# Patient Record
Sex: FEMALE | Race: WHITE | NOT HISPANIC OR LATINO | Employment: FULL TIME | URBAN - NONMETROPOLITAN AREA
[De-identification: names, ages, dates, MRNs, and addresses within clinical notes are randomized per-mention and may not be internally consistent; named-entity substitution may affect disease eponyms.]

---

## 2018-01-02 ENCOUNTER — APPOINTMENT (OUTPATIENT)
Dept: CT IMAGING | Facility: HOSPITAL | Age: 43
End: 2018-01-02

## 2018-01-02 ENCOUNTER — APPOINTMENT (OUTPATIENT)
Dept: GENERAL RADIOLOGY | Facility: HOSPITAL | Age: 43
End: 2018-01-02

## 2018-01-02 ENCOUNTER — HOSPITAL ENCOUNTER (EMERGENCY)
Facility: HOSPITAL | Age: 43
Discharge: HOME | End: 2018-01-02
Attending: EMERGENCY MEDICINE | Admitting: EMERGENCY MEDICINE

## 2018-01-02 VITALS
DIASTOLIC BLOOD PRESSURE: 88 MMHG | SYSTOLIC BLOOD PRESSURE: 136 MMHG | OXYGEN SATURATION: 100 % | TEMPERATURE: 98.3 F | WEIGHT: 293 LBS | RESPIRATION RATE: 18 BRPM | HEIGHT: 64 IN | BODY MASS INDEX: 50.02 KG/M2 | HEART RATE: 77 BPM

## 2018-01-02 DIAGNOSIS — S16.1XXA NECK STRAIN, INITIAL ENCOUNTER: ICD-10-CM

## 2018-01-02 DIAGNOSIS — S09.90XA INJURY OF HEAD, INITIAL ENCOUNTER: ICD-10-CM
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
## 2018-01-02 PROCEDURE — 72040 X-RAY EXAM NECK SPINE 2-3 VW: CPT

## 2018-01-02 PROCEDURE — 99283 EMERGENCY DEPT VISIT LOW MDM: CPT

## 2018-01-02 PROCEDURE — 70450 CT HEAD/BRAIN W/O DYE: CPT

## 2018-01-02 RX ORDER — CYCLOBENZAPRINE HCL 10 MG
10 TABLET ORAL 2 TIMES DAILY PRN
Qty: 14 TABLET | Refills: 0 | Status: SHIPPED | OUTPATIENT
Start: 2018-01-02 | End: 2018-06-23

## 2018-01-02 RX ORDER — CYCLOBENZAPRINE HCL 10 MG
10 TABLET ORAL ONCE
Status: COMPLETED | OUTPATIENT
Start: 2018-01-02 | End: 2018-01-02

## 2018-01-02 RX ORDER — ONDANSETRON 4 MG/1
4 TABLET, ORALLY DISINTEGRATING ORAL ONCE
Status: COMPLETED | OUTPATIENT
Start: 2018-01-02 | End: 2018-01-02

## 2018-01-02 RX ORDER — ONDANSETRON 4 MG/1
4 TABLET, ORALLY DISINTEGRATING ORAL EVERY 6 HOURS PRN
Qty: 15 TABLET | Refills: 0 | Status: SHIPPED | OUTPATIENT
Start: 2018-01-02 | End: 2018-06-23

## 2018-01-02 RX ADMIN — CYCLOBENZAPRINE HYDROCHLORIDE 10 MG: 10 TABLET, FILM COATED ORAL at 22:25

## 2018-01-02 RX ADMIN — ONDANSETRON 4 MG: 4 TABLET, ORALLY DISINTEGRATING ORAL at 21:02
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
## 2018-03-17 ENCOUNTER — APPOINTMENT (OUTPATIENT)
Dept: CT IMAGING | Facility: HOSPITAL | Age: 43
End: 2018-03-17

## 2018-03-17 ENCOUNTER — APPOINTMENT (OUTPATIENT)
Dept: GENERAL RADIOLOGY | Facility: HOSPITAL | Age: 43
End: 2018-03-17

## 2018-03-17 ENCOUNTER — HOSPITAL ENCOUNTER (EMERGENCY)
Facility: HOSPITAL | Age: 43
Discharge: HOME | End: 2018-03-17
Attending: EMERGENCY MEDICINE | Admitting: EMERGENCY MEDICINE

## 2018-03-17 VITALS
SYSTOLIC BLOOD PRESSURE: 135 MMHG | RESPIRATION RATE: 16 BRPM | WEIGHT: 290 LBS | BODY MASS INDEX: 49.51 KG/M2 | HEART RATE: 80 BPM | OXYGEN SATURATION: 98 % | TEMPERATURE: 99.1 F | HEIGHT: 64 IN | DIASTOLIC BLOOD PRESSURE: 86 MMHG

## 2018-03-17 DIAGNOSIS — J18.9 PNEUMONIA DUE TO INFECTIOUS ORGANISM, UNSPECIFIED LATERALITY, UNSPECIFIED PART OF LUNG: Primary | ICD-10-CM

## 2018-03-17 LAB
ALBUMIN SERPL-MCNC: 4 G/DL (ref 3.5–5)
ALBUMIN/GLOB SERPL: 1.1 G/DL (ref 1–2)
ALP SERPL-CCNC: 76 U/L (ref 38–126)
ALT SERPL W P-5'-P-CCNC: 28 U/L (ref 13–69)
ANION GAP SERPL CALCULATED.3IONS-SCNC: 16.7 MMOL/L
AST SERPL-CCNC: 24 U/L (ref 15–46)
BASOPHILS # BLD AUTO: 0.08 10*3/MM3 (ref 0–0.2)
BASOPHILS NFR BLD AUTO: 0.8 % (ref 0–2.5)
BILIRUB SERPL-MCNC: 0.3 MG/DL (ref 0.2–1.3)
BUN BLD-MCNC: 13 MG/DL (ref 7–20)
BUN/CREAT SERPL: 16.3 (ref 7.1–23.5)
CALCIUM SPEC-SCNC: 9.3 MG/DL (ref 8.4–10.2)
CHLORIDE SERPL-SCNC: 105 MMOL/L (ref 98–107)
CO2 SERPL-SCNC: 27 MMOL/L (ref 26–30)
CREAT BLD-MCNC: 0.8 MG/DL (ref 0.6–1.3)
DEPRECATED RDW RBC AUTO: 41.6 FL (ref 37–54)
EOSINOPHIL # BLD AUTO: 0.21 10*3/MM3 (ref 0–0.7)
EOSINOPHIL NFR BLD AUTO: 2 % (ref 0–7)
ERYTHROCYTE [DISTWIDTH] IN BLOOD BY AUTOMATED COUNT: 12.6 % (ref 11.5–14.5)
GFR SERPL CREATININE-BSD FRML MDRD: 79 ML/MIN/1.73
GLOBULIN UR ELPH-MCNC: 3.5 GM/DL
GLUCOSE BLD-MCNC: 100 MG/DL (ref 74–98)
HCG SERPL QL: NEGATIVE
HCT VFR BLD AUTO: 41.4 % (ref 37–47)
HGB BLD-MCNC: 13.6 G/DL (ref 12–16)
HOLD SPECIMEN: NORMAL
HOLD SPECIMEN: NORMAL
IMM GRANULOCYTES # BLD: 0.03 10*3/MM3 (ref 0–0.06)
IMM GRANULOCYTES NFR BLD: 0.3 % (ref 0–0.6)
LYMPHOCYTES # BLD AUTO: 2.39 10*3/MM3 (ref 0.6–3.4)
LYMPHOCYTES NFR BLD AUTO: 22.7 % (ref 10–50)
MCH RBC QN AUTO: 29.8 PG (ref 27–31)
MCHC RBC AUTO-ENTMCNC: 32.9 G/DL (ref 30–37)
MCV RBC AUTO: 90.8 FL (ref 81–99)
MONOCYTES # BLD AUTO: 0.98 10*3/MM3 (ref 0–0.9)
MONOCYTES NFR BLD AUTO: 9.3 % (ref 0–12)
NEUTROPHILS # BLD AUTO: 6.86 10*3/MM3 (ref 2–6.9)
NEUTROPHILS NFR BLD AUTO: 64.9 % (ref 37–80)
NRBC BLD MANUAL-RTO: 0 /100 WBC (ref 0–0)
PLATELET # BLD AUTO: 341 10*3/MM3 (ref 130–400)
PMV BLD AUTO: 8.8 FL (ref 6–12)
POTASSIUM BLD-SCNC: 3.7 MMOL/L (ref 3.5–5.1)
PROT SERPL-MCNC: 7.5 G/DL (ref 6.3–8.2)
RBC # BLD AUTO: 4.56 10*6/MM3 (ref 4.2–5.4)
SODIUM BLD-SCNC: 145 MMOL/L (ref 137–145)
TROPONIN I SERPL-MCNC: 0 NG/ML (ref 0–0.05)
TROPONIN I SERPL-MCNC: <0.012 NG/ML (ref 0–0.03)
WBC NRBC COR # BLD: 10.55 10*3/MM3 (ref 4.8–10.8)
WHOLE BLOOD HOLD SPECIMEN: NORMAL
WHOLE BLOOD HOLD SPECIMEN: NORMAL

## 2018-03-17 PROCEDURE — 93005 ELECTROCARDIOGRAM TRACING: CPT

## 2018-03-17 PROCEDURE — 80053 COMPREHEN METABOLIC PANEL: CPT | Performed by: EMERGENCY MEDICINE

## 2018-03-17 PROCEDURE — 71275 CT ANGIOGRAPHY CHEST: CPT

## 2018-03-17 PROCEDURE — 94640 AIRWAY INHALATION TREATMENT: CPT

## 2018-03-17 PROCEDURE — 99284 EMERGENCY DEPT VISIT MOD MDM: CPT

## 2018-03-17 PROCEDURE — 25010000002 IOPAMIDOL 61 % SOLUTION: Performed by: EMERGENCY MEDICINE

## 2018-03-17 PROCEDURE — 85025 COMPLETE CBC W/AUTO DIFF WBC: CPT | Performed by: EMERGENCY MEDICINE

## 2018-03-17 PROCEDURE — 93005 ELECTROCARDIOGRAM TRACING: CPT | Performed by: EMERGENCY MEDICINE

## 2018-03-17 PROCEDURE — 84484 ASSAY OF TROPONIN QUANT: CPT | Performed by: EMERGENCY MEDICINE

## 2018-03-17 PROCEDURE — 84703 CHORIONIC GONADOTROPIN ASSAY: CPT | Performed by: EMERGENCY MEDICINE

## 2018-03-17 PROCEDURE — 94799 UNLISTED PULMONARY SVC/PX: CPT

## 2018-03-17 PROCEDURE — 71045 X-RAY EXAM CHEST 1 VIEW: CPT

## 2018-03-17 RX ORDER — CEFUROXIME AXETIL 500 MG/1
500 TABLET ORAL 2 TIMES DAILY
Qty: 20 TABLET | Refills: 0 | Status: SHIPPED | OUTPATIENT
Start: 2018-03-17 | End: 2018-06-23

## 2018-03-17 RX ORDER — AZITHROMYCIN 250 MG/1
TABLET, FILM COATED ORAL
Qty: 4 TABLET | Refills: 0 | Status: SHIPPED | OUTPATIENT
Start: 2018-03-17 | End: 2018-06-23

## 2018-03-17 RX ORDER — PREDNISONE 20 MG/1
20 TABLET ORAL DAILY
Qty: 4 TABLET | Refills: 0 | Status: SHIPPED | OUTPATIENT
Start: 2018-03-17 | End: 2018-06-23

## 2018-03-17 RX ORDER — AZITHROMYCIN 250 MG/1
500 TABLET, FILM COATED ORAL ONCE
Status: COMPLETED | OUTPATIENT
Start: 2018-03-17 | End: 2018-03-17

## 2018-03-17 RX ORDER — IPRATROPIUM BROMIDE AND ALBUTEROL SULFATE 2.5; .5 MG/3ML; MG/3ML
3 SOLUTION RESPIRATORY (INHALATION) ONCE
Status: COMPLETED | OUTPATIENT
Start: 2018-03-17 | End: 2018-03-17

## 2018-03-17 RX ORDER — SODIUM CHLORIDE 0.9 % (FLUSH) 0.9 %
10 SYRINGE (ML) INJECTION AS NEEDED
Status: DISCONTINUED | OUTPATIENT
Start: 2018-03-17 | End: 2018-03-17 | Stop reason: HOSPADM

## 2018-03-17 RX ORDER — CEFUROXIME AXETIL 250 MG/1
500 TABLET ORAL ONCE
Status: COMPLETED | OUTPATIENT
Start: 2018-03-17 | End: 2018-03-17

## 2018-03-17 RX ADMIN — CEFUROXIME AXETIL 500 MG: 250 TABLET ORAL at 07:15

## 2018-03-17 RX ADMIN — IPRATROPIUM BROMIDE AND ALBUTEROL SULFATE 3 ML: .5; 3 SOLUTION RESPIRATORY (INHALATION) at 05:00

## 2018-03-17 RX ADMIN — AZITHROMYCIN 500 MG: 250 TABLET, FILM COATED ORAL at 07:15

## 2018-03-17 RX ADMIN — IOPAMIDOL 100 ML: 612 INJECTION, SOLUTION INTRAVENOUS at 05:33

## 2018-03-17 NOTE — ED PROVIDER NOTES
TRIAGE CHIEF COMPLAINT:     Nursing and triage notes reviewed    Chief Complaint   Patient presents with   • Shortness of Breath      HPI: Kenya Hannon is a 42 y.o. female who presents to the emergency department complaining of Shortness of breath.  Patient states that over the past several days she has had symptoms of an upper respiratory infection with nasal drainage and congestion.  She states she's felt progressively more short of breath during this time as well and becomes very dyspneic on exertion.  She states occasionally she has had some left-sided chest pain that she describes as pressure like lasting for a few minutes at a time and nonradiating.  This does not seem to be associated with exertion.'s obvious swelling in her lower extremities.  She states a friend told her she might have a blood clot and she should come to the emergency department.  She's not had any fevers or chills that she is aware of.  She denies any current discomfort.     REVIEW OF SYSTEMS: All other systems reviewed and are negative     PAST MEDICAL HISTORY:   Past Medical History:   Diagnosis Date   • Anemia    • PFO (patent foramen ovale)         FAMILY HISTORY:   History reviewed. No pertinent family history.     SOCIAL HISTORY:   Social History     Social History   • Marital status:      Spouse name: N/A   • Number of children: N/A   • Years of education: N/A     Occupational History   • Not on file.     Social History Main Topics   • Smoking status: Never Smoker   • Smokeless tobacco: Never Used   • Alcohol use Yes      Comment: socially    • Drug use: No   • Sexual activity: Defer     Other Topics Concern   • Not on file     Social History Narrative   • No narrative on file        SURGICAL HISTORY:   Past Surgical History:   Procedure Laterality Date   • CHOLECYSTECTOMY     • DILATATION AND CURETTAGE          CURRENT MEDICATIONS:      Medication List      ASK your doctor about these medications    CELEXA PO      cyclobenzaprine 10 MG tablet  Commonly known as:  FLEXERIL  Take 1 tablet by mouth 2 (Two) Times a Day As Needed for Muscle Spasms.     IRON-C PO     ondansetron ODT 4 MG disintegrating tablet  Commonly known as:  ZOFRAN-ODT  Take 1 tablet by mouth Every 6 (Six) Hours As Needed for Nausea or   Vomiting.           ALLERGIES: Review of patient's allergies indicates no known allergies.     PHYSICAL EXAM:   VITAL SIGNS:   Vitals:    03/17/18 0500   BP:    Pulse: 73   Resp: 16   Temp:    SpO2: 97%      CONSTITUTIONAL: Awake, oriented, appears non-toxic   HENT: Atraumatic, normocephalic, oral mucosa pink and moist, airway patent. Nares patent with a small amount of clear drainage drainage. External ears normal.   EYES: Conjunctiva clear, EOMI, PERRL   NECK: Trachea midline, non-tender, supple   CARDIOVASCULAR: Normal heart rate, Normal rhythm, No murmurs, rubs, gallops   PULMONARY/CHEST: Clear to auscultation, no rhonchi, wheezes, or rales. Symmetrical breath sounds.  ABDOMINAL: Non-distended, soft, non-tender - no rebound or guarding.   NEUROLOGIC: Non-focal, moving all four extremities, no gross sensory or motor deficits.   EXTREMITIES: No clubbing, cyanosis, or edema   SKIN: Warm, Dry, No erythema, No rash     ED COURSE / MEDICAL DECISION MAKING:   Kenya Hannon is a 42 y.o. female who presents to the emergency department for evaluation of shortness of breath.  Patient nondistressed on arrival was stable vital signs.  Patient breathing comfortably on room air on arrival.  No obvious abnormal lung sounds heard on examination.  EKG obtained on arrival reveals sinus rhythm with a rate of 78 bpm.  There are some nonspecific findings but no obvious acute ischemic changes.  Patient does take birth control home and so does carry a higher risk of blood clot.  We'll obtain imaging as well as labs for further evaluation.  Labs were largely unremarkable.  CT scan revealed bilateral groundglass opacities which likely represent  an infectious process.  Patient started on antibiotics.  She is safe for outpatient therapy.  Return precautions were discussed.    DECISION TO DISCHARGE/ADMIT: see ED care timeline     FINAL IMPRESSION:   1 -- pneumonia   2 --   3 --     Electronically signed by: Clementina Delarosa MD, 3/17/2018 5:21 AM       Clementina Delarosa MD  03/17/18 0708

## 2018-12-17 ENCOUNTER — HOSPITAL ENCOUNTER (EMERGENCY)
Facility: HOSPITAL | Age: 43
Discharge: HOME | End: 2018-12-17
Attending: STUDENT IN AN ORGANIZED HEALTH CARE EDUCATION/TRAINING PROGRAM | Admitting: STUDENT IN AN ORGANIZED HEALTH CARE EDUCATION/TRAINING PROGRAM

## 2018-12-17 VITALS
HEIGHT: 64 IN | BODY MASS INDEX: 50.02 KG/M2 | TEMPERATURE: 98.8 F | WEIGHT: 293 LBS | SYSTOLIC BLOOD PRESSURE: 126 MMHG | DIASTOLIC BLOOD PRESSURE: 78 MMHG | HEART RATE: 71 BPM | RESPIRATION RATE: 18 BRPM | OXYGEN SATURATION: 98 %

## 2018-12-17 DIAGNOSIS — T78.40XA ALLERGIC REACTION, INITIAL ENCOUNTER: Primary | ICD-10-CM

## 2018-12-17 PROCEDURE — 96374 THER/PROPH/DIAG INJ IV PUSH: CPT

## 2018-12-17 PROCEDURE — 25010000002 DEXAMETHASONE SODIUM PHOSPHATE 10 MG/ML SOLUTION: Performed by: STUDENT IN AN ORGANIZED HEALTH CARE EDUCATION/TRAINING PROGRAM

## 2018-12-17 PROCEDURE — 25010000002 DIPHENHYDRAMINE PER 50 MG: Performed by: STUDENT IN AN ORGANIZED HEALTH CARE EDUCATION/TRAINING PROGRAM

## 2018-12-17 PROCEDURE — 99283 EMERGENCY DEPT VISIT LOW MDM: CPT

## 2018-12-17 PROCEDURE — 96375 TX/PRO/DX INJ NEW DRUG ADDON: CPT

## 2018-12-17 RX ORDER — DIPHENHYDRAMINE HYDROCHLORIDE 50 MG/ML
25 INJECTION INTRAMUSCULAR; INTRAVENOUS ONCE
Status: COMPLETED | OUTPATIENT
Start: 2018-12-17 | End: 2018-12-17

## 2018-12-17 RX ORDER — DEXAMETHASONE SODIUM PHOSPHATE 10 MG/ML
10 INJECTION, SOLUTION INTRAMUSCULAR; INTRAVENOUS ONCE
Status: COMPLETED | OUTPATIENT
Start: 2018-12-17 | End: 2018-12-17

## 2018-12-17 RX ORDER — FAMOTIDINE 10 MG/ML
20 INJECTION, SOLUTION INTRAVENOUS ONCE
Status: COMPLETED | OUTPATIENT
Start: 2018-12-17 | End: 2018-12-17

## 2018-12-17 RX ADMIN — DIPHENHYDRAMINE HYDROCHLORIDE 25 MG: 50 INJECTION INTRAMUSCULAR; INTRAVENOUS at 21:15

## 2018-12-17 RX ADMIN — FAMOTIDINE 20 MG: 10 INJECTION, SOLUTION INTRAVENOUS at 21:19

## 2018-12-17 RX ADMIN — DEXAMETHASONE SODIUM PHOSPHATE 10 MG: 10 INJECTION, SOLUTION INTRAMUSCULAR; INTRAVENOUS at 21:17

## 2018-12-18 NOTE — ED PROVIDER NOTES
Subjective   43-year-old female that presents with allergic reaction.  Patient states over the past 2 days she's had progressively worsening hives and itching and tonight her throat began to itch and feel like it was swelling.  Patient has had these reactions quite frequently recently and has been following with an immunologist to try to determine the cause.  Patient states her symptoms have been progressively worsening.  States her rash is very itchy.  There is no swelling of her lips or tongue.            Review of Systems   All other systems reviewed and are negative.      Past Medical History:   Diagnosis Date   • Anemia    • Anxiety    • Depression    • GERD (gastroesophageal reflux disease)    • PFO (patent foramen ovale)        No Known Allergies    Past Surgical History:   Procedure Laterality Date   • CHOLECYSTECTOMY     • DILATATION AND CURETTAGE         Family History   Problem Relation Age of Onset   • Diabetes Mother    • Heart disease Mother    • Heart disease Father    • Diabetes Father        Social History     Socioeconomic History   • Marital status:      Spouse name: Not on file   • Number of children: Not on file   • Years of education: Not on file   • Highest education level: Not on file   Tobacco Use   • Smoking status: Never Smoker   • Smokeless tobacco: Never Used   Substance and Sexual Activity   • Alcohol use: Yes     Comment: socially    • Drug use: No   • Sexual activity: Defer           Objective   Physical Exam   Nursing note and vitals reviewed.   GEN: No acute distress  Skin: Patient does have urticarial hives covering her chest and neck  Head: Normocephalic, atraumatic  Eyes: Pupils equal round reactive to light  ENT: Posterior pharynx normal in appearance, no swelling of the lips tongue or of the soft tissues of the oropharynx oral mucosa is moist  Chest: Nontender to palpation  Cardiovascular: Regular rate  Lungs: Clear to auscultation bilaterally  Abdomen: Soft, nontender,  nondistended, no peritoneal signs  Extremities: No edema, normal appearance  Neuro: GCS 15  Psych: Mood and affect are appropriate        Procedures           ED Course                  MDM  Number of Diagnoses or Management Options  Allergic reaction, initial encounter:   Diagnosis management comments: Patient is treated with IV steroids, H1 and H2 blockers.  She was observed in the emergency department for several hours with improvement in her symptoms.       Amount and/or Complexity of Data Reviewed  Decide to obtain previous medical records or to obtain history from someone other than the patient: yes  Review and summarize past medical records: yes          Final diagnoses:   Allergic reaction, initial encounter            Jose Canela MD  12/17/18 0857

## 2018-12-18 NOTE — DISCHARGE INSTRUCTIONS
Continue to follow-up with your allergist would determine what is causing her symptoms.  If you have new or worsening symptoms please return to the emergency department.

## 2019-11-08 ENCOUNTER — TELEPHONE (OUTPATIENT)
Dept: URGENT CARE | Facility: CLINIC | Age: 44
End: 2019-11-08

## 2019-11-08 DIAGNOSIS — N39.0 URINARY TRACT INFECTION WITHOUT HEMATURIA, SITE UNSPECIFIED: Primary | ICD-10-CM

## 2019-11-08 RX ORDER — SULFAMETHOXAZOLE AND TRIMETHOPRIM 800; 160 MG/1; MG/1
TABLET ORAL
Qty: 14 TABLET | Refills: 0 | Status: SHIPPED | OUTPATIENT
Start: 2019-11-08 | End: 2020-11-17

## 2019-11-08 NOTE — TELEPHONE ENCOUNTER
I have spoken with patient.  She needs to either return to the clinic for recheck or follow-up with her primary care.  Patient is aware

## 2020-11-17 PROCEDURE — U0004 COV-19 TEST NON-CDC HGH THRU: HCPCS | Performed by: NURSE PRACTITIONER

## 2022-04-12 ENCOUNTER — OFFICE VISIT (OUTPATIENT)
Dept: OBSTETRICS AND GYNECOLOGY | Facility: CLINIC | Age: 47
End: 2022-04-12

## 2022-04-12 VITALS
WEIGHT: 293 LBS | BODY MASS INDEX: 50.02 KG/M2 | DIASTOLIC BLOOD PRESSURE: 70 MMHG | SYSTOLIC BLOOD PRESSURE: 118 MMHG | HEIGHT: 64 IN

## 2022-04-12 DIAGNOSIS — N93.9 ABNORMAL UTERINE BLEEDING (AUB): ICD-10-CM

## 2022-04-12 DIAGNOSIS — N39.3 STRESS INCONTINENCE IN FEMALE: ICD-10-CM

## 2022-04-12 DIAGNOSIS — Z01.419 ROUTINE GYNECOLOGICAL EXAMINATION: Primary | ICD-10-CM

## 2022-04-12 DIAGNOSIS — E66.01 MORBID OBESITY WITH BMI OF 50.0-59.9, ADULT: ICD-10-CM

## 2022-04-12 PROCEDURE — 99213 OFFICE O/P EST LOW 20 MIN: CPT | Performed by: OBSTETRICS & GYNECOLOGY

## 2022-04-12 PROCEDURE — 99396 PREV VISIT EST AGE 40-64: CPT | Performed by: OBSTETRICS & GYNECOLOGY

## 2022-04-12 RX ORDER — SCOLOPAMINE TRANSDERMAL SYSTEM 1 MG/1
PATCH, EXTENDED RELEASE TRANSDERMAL
COMMUNITY
Start: 2022-01-03 | End: 2022-12-12

## 2022-04-12 RX ORDER — ESCITALOPRAM OXALATE 10 MG/1
10 TABLET ORAL DAILY
COMMUNITY
Start: 2022-03-23

## 2022-04-12 RX ORDER — CLONAZEPAM 1 MG/1
TABLET ORAL
COMMUNITY
Start: 2022-01-03

## 2022-04-13 DIAGNOSIS — N39.3 STRESS INCONTINENCE OF URINE: Primary | ICD-10-CM

## 2022-04-13 PROBLEM — Z01.419 ROUTINE GYNECOLOGICAL EXAMINATION: Status: ACTIVE | Noted: 2022-04-13

## 2022-04-13 PROBLEM — N93.9 ABNORMAL UTERINE BLEEDING (AUB): Status: ACTIVE | Noted: 2022-04-13

## 2022-04-13 PROBLEM — E66.01 MORBID OBESITY WITH BMI OF 50.0-59.9, ADULT (HCC): Status: ACTIVE | Noted: 2022-04-13

## 2022-04-13 LAB
ERYTHROCYTE [DISTWIDTH] IN BLOOD BY AUTOMATED COUNT: 12.2 % (ref 12.3–15.4)
HCT VFR BLD AUTO: 40.4 % (ref 34–46.6)
HGB BLD-MCNC: 13.2 G/DL (ref 12–15.9)
MCH RBC QN AUTO: 28.8 PG (ref 26.6–33)
MCHC RBC AUTO-ENTMCNC: 32.7 G/DL (ref 31.5–35.7)
MCV RBC AUTO: 88 FL (ref 79–97)
PLATELET # BLD AUTO: 373 10*3/MM3 (ref 140–450)
RBC # BLD AUTO: 4.59 10*6/MM3 (ref 3.77–5.28)
WBC # BLD AUTO: 8.22 10*3/MM3 (ref 3.4–10.8)

## 2022-04-13 RX ORDER — NYSTATIN AND TRIAMCINOLONE ACETONIDE 100000; 1 [USP'U]/G; MG/G
1 OINTMENT TOPICAL DAILY
Qty: 30 G | Refills: 1 | Status: SHIPPED | OUTPATIENT
Start: 2022-04-13 | End: 2022-12-12

## 2022-04-13 RX ORDER — NYSTATIN AND TRIAMCINOLONE ACETONIDE 100000; 1 [USP'U]/G; MG/G
1 OINTMENT TOPICAL DAILY
Qty: 30 G | Refills: 1 | Status: SHIPPED | OUTPATIENT
Start: 2022-04-13 | End: 2022-04-13 | Stop reason: SDUPTHER

## 2022-04-13 RX ORDER — NYSTATIN AND TRIAMCINOLONE ACETONIDE 100000; 1 [USP'U]/G; MG/G
1 OINTMENT TOPICAL 2 TIMES DAILY
Qty: 30 G | Refills: 1 | Status: SHIPPED | OUTPATIENT
Start: 2022-04-13

## 2022-05-10 ENCOUNTER — TELEPHONE (OUTPATIENT)
Dept: OBSTETRICS AND GYNECOLOGY | Facility: CLINIC | Age: 47
End: 2022-05-10

## 2022-05-19 PROCEDURE — U0004 COV-19 TEST NON-CDC HGH THRU: HCPCS | Performed by: NURSE PRACTITIONER

## 2022-07-11 ENCOUNTER — TREATMENT (OUTPATIENT)
Dept: PHYSICAL THERAPY | Facility: CLINIC | Age: 47
End: 2022-07-11

## 2022-07-11 DIAGNOSIS — N39.3 SUI (STRESS URINARY INCONTINENCE, FEMALE): ICD-10-CM

## 2022-07-11 DIAGNOSIS — M62.89 PELVIC FLOOR DYSFUNCTION: ICD-10-CM

## 2022-07-11 DIAGNOSIS — N81.89 PELVIC FLOOR WEAKNESS: Primary | ICD-10-CM

## 2022-07-11 PROCEDURE — 97162 PT EVAL MOD COMPLEX 30 MIN: CPT | Performed by: PHYSICAL THERAPIST

## 2022-07-11 NOTE — PROGRESS NOTES
Physical Therapy Initial Evaluation and Plan of Care    Patient: Kenya Hannon   : 1975  Diagnosis/ICD-10 Code:  Pelvic floor weakness [N81.89]  Referring practitioner: Marta Cano*  Date of Initial Visit: 2022  Today's Date: 2022  Patient seen for 1 sessions      Subjective  The patient reports a history of frequent UTIs. On average, she states she has gotten two UTIs a year for as long as she can remember. She also has a history of a very painful and heavy menstrual cycle. She states that she has two children and experienced traumatic births with both. With her second child she had significant vaginal tearing but she does not remember what grade. She requires wearing a pad every day for urinary leaking.  Chief Complaint:   Chief Complaint   Patient presents with   • Initial Evaluation      Functional Outcome Measure: PFDI-20 score: 97.9    Pain  0/10    Bladder function:    Incontinence: Yes  # of pads in 24 hours: Long thin pad. Requires at least one on average but if she is coughing or sneezing, she might require to change every hr. It is worse if she has a UTI.   How soaked is pad when changed: 50% up to 100%  What specifically makes you leak: Coughing, sneezing, and sudden movements.  Leak at night: None  Urination at night: 3x  Use bathroom “just in case”: Yes  Strong urinary urge: With UTIs  Leaking with urge: With UTIs  Complete bladder voiding %: Most of the time but goes to bathroom every hour most days.  Urinary splaying: Yes  Post-micturition dribble: Yes  Frequency of urination: Every hour but can hold up to 2-3 hrs  Discomfort with urination: None  Vaginal or anal itching: External itching in the vagina. Has been worked up by GYN- No known cause.  Fluid consumed daily: 64 oz water bottle x4/day. Three cups of coffee a day and the occasional diet coke every other day.     Bowel function:    How many BM's/day: 1-2  Person Stool Scale Type: 3-6  Discomfort with BM:  No  Complete bowel voiding %: Most of the time not always   Assistance to pass stool: Rocking sometimes  Diet: Avoids spicy foods  Incontinence at night: No  Incontinence with gas: No  Ability to pass gas: Yes    Sexual activity  Sexually active: No  Positions of comfort & discomfort: Hx of pain with some positions. Tilted uterus.      Diagnostics:    PMH:   Past Medical History:   Diagnosis Date   • Anemia    • Anxiety    • Chronic constipation    • Depression    • Fibrocystic change of breast    • GERD (gastroesophageal reflux disease)    • Hypoglycemic disorder    • Ovarian cyst    • Patent foramen ovale    • PFO (patent foramen ovale)    • SAB (spontaneous )    •  (spontaneous vaginal delivery)    • UTI (urinary tract infection)     Frequent        Surgical HX:   Past Surgical History:   Procedure Laterality Date   • CHOLECYSTECTOMY     • COLONOSCOPY     • DILATATION AND CURETTAGE     • ENDOSCOPY     • WISDOM TOOTH EXTRACTION          Menstrual cycle: Yes. Heavy flow. Regular. Painful.    Birth HX: A1. Two children 26yo. And 24yo. Two traumatic births 1st episiotomy. Significant tearing with the 2nd child.  Meds:   Current Outpatient Medications:   •  clonazePAM (KlonoPIN) 1 MG tablet, TAKE 1 TABLET BY MOUTH 30 MINUTES PRIOR TO FLIGHT AS DIRECTED, Disp: , Rfl:   •  escitalopram (LEXAPRO) 10 MG tablet, Take 10 mg by mouth Daily., Disp: , Rfl:   •  Ferrous Gluconate-C-Folic Acid (IRON-C PO), Take  by mouth., Disp: , Rfl:   •  metoprolol tartrate (LOPRESSOR) 25 MG tablet, Take 12.5 mg by mouth 2 (Two) Times a Day., Disp: , Rfl:   •  nystatin-triamcinolone (MYCOLOG) 241517-3.1 UNIT/GM-% ointment, Apply 1 application topically to the appropriate area as directed 2 (Two) Times a Day., Disp: 30 g, Rfl: 1  •  nystatin-triamcinolone (MYCOLOG) 805722-0.1 UNIT/GM-% ointment, Apply 1 application topically to the appropriate area as directed Daily., Disp: 30 g, Rfl: 1  •  ondansetron ODT (ZOFRAN-ODT) 8 MG  disintegrating tablet, Place 1 tablet on the tongue Every 8 (Eight) Hours As Needed for Nausea., Disp: 15 tablet, Rfl: 0  •  pantoprazole (PROTONIX) 40 MG EC tablet, , Disp: , Rfl:   •  Scopolamine 1 MG/3DAYS patch, APPLY one patch behind ear every 3 days as directed, Disp: , Rfl:   •  vitamin B-12 (CYANOCOBALAMIN) 500 MCG tablet, Take 500 mcg by mouth Daily., Disp: , Rfl:   •  Vitamin D, Cholecalciferol, (CHOLECALCIFEROL) 10 MCG (400 UNIT) tablet, Take 400 Units by mouth Daily., Disp: , Rfl:      Occupation:  at [employer]    Activity level/exercise routine: Walking      Objective    Patient independently ambulates into clinic.     Verbal consent obtained for internal pelvic exam/treatment. Pt gave consent for Cuauhtemoc Goetz PT to be present in room for and participate in exam/treatment.   Posture: Rounded shoulders and forward head  Palpation:  Supine:   Abdominals: Unremarkable for tension B  Iliacus : Unremarkable for tension B  Psoas :  Unremarkable for tension B    PELVIC FLOOR   External:    Sensation: Intact B   Skin quality: Unremarkable   Ischiocavernosus: Unremarkable for tension B   Supf and deep transverse perineal muscle: Unremarkable for tension B       Internal:   Sensation: Intact B  Bulge: Intact   Knack: Incomplete     Internal superficial PFM: Unremarkable for tension B   Levator ani: Mild tension on the L with 1/10 TTP. Unremarkable for tension on the R    Compressor urethra: Unremarkable for tension B      PERF SCALE:  Power: 2+    Endurance: 5    Repetitions: 5    Fast twitch: 4        See flowsheet for details of treatment following evaluation.    Basic information was provided regarding pelvic floor anatomy and explanation of the function of the pelvic floor. Instruction began regarding fluid intake.    Student PT Royce Haas provided treatment under my direct supervision.      Assessment/Plan:     Assessment/Plan    The patient is a 46 y.o. female who presents to  physical therapy today for urinary incontinence. Upon initial evaluation, the patient demonstrates the following impairments: mild tension to the pelvic floor musculature and decreased strength, endurance, and coordination of the pelvic floor muscles. Due to these impairments, the patient is unable to cough, sneeze, exercise, or perform quick movements without incontinence. The patient would benefit from skilled pelvic PT services to address functional limitations and impairments and to improve patient quality of life.      Goals:   STG's: 4 weeks   · Patient will report > 25% improvement in urinary symptoms  · Patient will limit the use of pads to <1 per day to improve comfort and QOL  · Patient will decrease voiding frequency to once every 2-3 hrs to improve function with daily activities   · Patient will be able to perform HEP with minimal verbal cues    LTG's: By discharge  · Patient will report >75% improvement in urinary symptoms   · Patient functional outcome measure test, PFDI-20 improved score by >50 %  · Patient will decrease urination at night to <1x  · Patient will be able to eliminate the use of pads to improve comfort and QOL  · Patient will be independent with HEP      Plan  Therapy options: will be seen for skilled physical therapy services  Planned modality interventions: TENS, ultrasound, cryotherapy, thermotherapy (hydrocollator packs)  Planned therapy interventions: abdominal trunk stabilization, manual therapy, neuromuscular re-education, body mechanics training, flexibility, functional ROM exercises, gait training, home exercise program, joint mobilization, therapeutic activities, stretching, strengthening, spinal/joint mobilization, soft tissue mobilization and postural training, dry needling  Pt prognosis: Good  Frequency: Weekly  Duration in visits:7  Duration in weeks: 12  Treatment plan discussed with: patient    Treatment Time: 0814 - 0901        Manual Therapy:    0     mins  61433;      Therapeutic Exercise:    5     mins  89337;     Neuromuscular Casandra:    0    mins  26331;    Therapeutic Activity:     0     mins  72207;     Gait Trainin     mins  98444;     Ultrasound:     0     mins  66431;    Ionto                               0    mins   68001  Self Care                       0     mins   10927  Canalith Repos    0     mins 17452      Un-Timed:  Electrical Stimulation:    0     mins  71722 ( );  Dry Needling     0     mins self-pay  Traction     0     mins 43559  Low Eval     0     Mins  36680  Mod Eval     42     Mins  38551  High Eval                       0     Mins  49317  Re-Eval                           0    mins  99147        Timed Treatment:   5   mins   Total Treatment:     42   mins    PT SIGNATURE:   Andry Green PT   [place] License # [id number]    DATE TREATMENT INITIATED: 2022    Initial Certification  Certification Period: 10/9/2022  I certify that the therapy services are furnished while this patient is under my care.  The services outlined above are required by this patient, and will be reviewed every 90 days.       PHYSICIAN: Marta Cano MD  NPI: [id number]                                           DATE: 2022     Please sign and return via fax to [phone]. Thank you, [facility].

## 2022-07-25 ENCOUNTER — TREATMENT (OUTPATIENT)
Dept: PHYSICAL THERAPY | Facility: CLINIC | Age: 47
End: 2022-07-25

## 2022-07-25 DIAGNOSIS — M62.89 PELVIC FLOOR DYSFUNCTION: ICD-10-CM

## 2022-07-25 DIAGNOSIS — N81.89 PELVIC FLOOR WEAKNESS: Primary | ICD-10-CM

## 2022-07-25 DIAGNOSIS — N39.3 SUI (STRESS URINARY INCONTINENCE, FEMALE): ICD-10-CM

## 2022-07-25 PROCEDURE — 97112 NEUROMUSCULAR REEDUCATION: CPT | Performed by: PHYSICAL THERAPIST

## 2022-07-25 PROCEDURE — 97110 THERAPEUTIC EXERCISES: CPT | Performed by: PHYSICAL THERAPIST

## 2022-07-25 NOTE — PROGRESS NOTES
Physical Therapy Daily Treatment Note  Patient: Kenya Hannon   : 1975  Diagnosis/ICD-10 Code:  Pelvic floor weakness [N81.89]  Referring practitioner: Marta Cano*  Date of Initial Visit: Type: THERAPY  Noted: 2022  Today's Date: 2022  Patient seen for 2 sessions      Subjective   Kenya Hannon reports has been on vacation and has not been compliant with her home exercise program.  Pain Rating (0-10): 0      Objective   verbal consent obtained for external pelvic exam/treatment. Pt gave consent for Royce Haas, Student PT to be present in room for and participate in exam/treatment.     Supine sEMG: Rest 3.5, Avg. 18.8, Max 36.6     See Exercise, Manual, and Modality Logs for complete treatment.     Student PT Royce Haas provided treatment under my direct supervision.      Assessment/Plan   Kenya has not been compliant with her HEP as she has been on vacation. She states that her symptoms have not changed since her initial visit. Today she did well with biofeedback with sEMG to visualize true coordinated pelvic floor muscle recruitment to address muscle weakness and coordination. She required minimal verbal cues to maintain holds and include distal extremity movement. She was provided with a HEP to perform and practice these exercises at home. Kenya will be followed and progressed with her strengthening exercises as tolerated.    Progress per Plan of Care          Timed: 0820- 0900       Manual Therapy:    0     mins  02410;     Therapeutic Exercise:    0     mins  86779;     Neuromuscular Casandra:    30    mins  88426;    Therapeutic Activity:     10     mins  63194;     Gait Trainin     mins  91124;     Ultrasound:     0     mins  15947;    Ionto                               0    mins   89266  Self Care                       0     mins   10325  Canalith Repos               0    mins  41473    Un-Timed:  Electrical Stimulation:    0     mins  76440 ( );  Dry  Needling     0     mins self-pay  Traction     0     mins 03701  Low Eval     0     Mins  21076  Mod Eval     0     Mins  85175  High Eval                       0     Mins  59639  Re-Eval                           0    mins  27191    Timed Treatment:   40   mins   Total Treatment:     40   mins    Andry Green, PT  [place] License # [id number]  Physical Therapist

## 2022-08-11 ENCOUNTER — TRANSCRIBE ORDERS (OUTPATIENT)
Dept: PHYSICAL THERAPY | Facility: CLINIC | Age: 47
End: 2022-08-11

## 2022-08-11 DIAGNOSIS — N39.3 FEMALE STRESS INCONTINENCE: Primary | ICD-10-CM

## 2022-11-03 ENCOUNTER — TELEPHONE (OUTPATIENT)
Dept: OBSTETRICS AND GYNECOLOGY | Facility: CLINIC | Age: 47
End: 2022-11-03

## 2022-11-03 NOTE — TELEPHONE ENCOUNTER
Pt calling to speak with Dr. Cano or nurse about a biopsy that she had discussed with Dr. Cano previously.

## 2022-11-03 NOTE — TELEPHONE ENCOUNTER
She said the Dr. Merrill left her a message back in 4/2022 after her US and suggested an endometrial biopsy. She has a Hx of miscarriage at [place] years ago and she remembers that experience and it was not pleasant and therefore has been reluctant to call back. Her periods are normal but painful. Does she need another US at this point or just apt for the biopsy?

## 2022-11-04 NOTE — TELEPHONE ENCOUNTER
Per Dr. Cano: Yes, another u/s and possible biopsy please.  Is she due for an annual?   ______________________________________________    Last annual: 4/12/2022  Per Dr. Cano: patient needs next available appt with U/S and see TOM afterwards.     lvom for pt to CB to get this scheduled.

## 2022-11-07 NOTE — TELEPHONE ENCOUNTER
Pt notified, she will be at work tomorrow and that is where jeff schedule is. She will call back to schedule and US and possible biopsy. Her Annual with pap was 4/2022 neg HPV negative

## 2022-12-12 ENCOUNTER — OFFICE VISIT (OUTPATIENT)
Dept: OBSTETRICS AND GYNECOLOGY | Facility: CLINIC | Age: 47
End: 2022-12-12
Payer: OTHER GOVERNMENT

## 2022-12-12 VITALS
HEIGHT: 64 IN | BODY MASS INDEX: 50.02 KG/M2 | DIASTOLIC BLOOD PRESSURE: 80 MMHG | WEIGHT: 293 LBS | SYSTOLIC BLOOD PRESSURE: 140 MMHG

## 2022-12-12 DIAGNOSIS — N94.6 DYSMENORRHEA: Primary | ICD-10-CM

## 2022-12-12 DIAGNOSIS — E66.01 MORBID OBESITY WITH BMI OF 50.0-59.9, ADULT: ICD-10-CM

## 2022-12-12 DIAGNOSIS — N93.9 ABNORMAL UTERINE BLEEDING (AUB): ICD-10-CM

## 2022-12-12 PROCEDURE — 58100 BIOPSY OF UTERUS LINING: CPT | Performed by: OBSTETRICS & GYNECOLOGY

## 2022-12-12 NOTE — PROGRESS NOTES
Gynecologic Procedure Note        Endometrial Biopsy      Indications:@ is a 47 y.o. , who presents today for endometrial biopsy.  The patient was noted to have Menorrhagia.  Her LMP is Patient's last menstrual period was 2022 (exact date). . After being presented with the risk, benefits, and specific detail of the procedure, the patient wished to proceed.  Written consent was obtained from patient.   Urine pregnancy test was Not indicated. Patient does not have an allergy to betadine or shellfish. The patient denies being sexually active.     Procedure Details   The patient was placed on the table in the dorsal lithotomy position.  She was draped in the appropriate manner.  A speculum was placed in the vagina.  The cervix was visualized and prepped with Betadine.  A tenaculum was placed on the anterior lip of the cervix for traction.  A small plastic 5 mm Pipelle syringe curette was inserted into the cervical canal.  The uterus was sounded to 8 cm's.  A vigorous four quadrant biopsy was performed, removing a medium amount of tissue.  The tissue was placed in Formalin and sent to Pathology.  The patient tolerated the procedure well and she reported moderate cramping.  The tenaculum was removed from the cervix and the speculum was removed.  The patient was observed for 10 minutes.           Complications: none.     Procedures    Review of Systems   Genitourinary: Positive for menstrual problem.   All other systems reviewed and are negative.      Plan:  Orders Placed This Encounter   Procedures   • US Non-ob Transvaginal     Order Specific Question:   Reason for Exam:     Answer:   menorraghia       Problem List Items Addressed This Visit     Abnormal uterine bleeding (AUB)    Morbid obesity with BMI of 50.0-59.9, adult (HCC)    Dysmenorrhea - Primary    Relevant Orders    US Non-ob Transvaginal (Completed)    TISSUE EXAM, [facility] ([facility]) (Completed)           Instructions  1. Call the office  in 5 business days for biopsy results.  2. Patient instructed to call the office if develops a fever of 100.4 or greater, vaginal bleeding heavier than a period, foul vaginal discharge or pain.     Marta Cano MD  12/12/2022

## 2022-12-15 LAB — REF LAB TEST METHOD: NORMAL

## 2022-12-16 ENCOUNTER — TELEPHONE (OUTPATIENT)
Dept: OBSTETRICS AND GYNECOLOGY | Facility: CLINIC | Age: 47
End: 2022-12-16
Payer: OTHER GOVERNMENT

## 2023-01-06 PROBLEM — N94.6 DYSMENORRHEA: Status: ACTIVE | Noted: 2023-01-06

## 2023-08-14 ENCOUNTER — TELEPHONE (OUTPATIENT)
Dept: OBSTETRICS AND GYNECOLOGY | Facility: CLINIC | Age: 48
End: 2023-08-14

## 2023-08-14 NOTE — TELEPHONE ENCOUNTER
Provider: DR GOTTLIEB  Caller: DESMOND HOYT   Phone Number: [phone]  Reason for Call: SAME DAY CANCELLATION//STARTED HER CYCLE//RESCHEDULED

## 2023-09-27 ENCOUNTER — OFFICE VISIT (OUTPATIENT)
Dept: OBSTETRICS AND GYNECOLOGY | Facility: CLINIC | Age: 48
End: 2023-09-27
Payer: OTHER GOVERNMENT

## 2023-09-27 VITALS
DIASTOLIC BLOOD PRESSURE: 78 MMHG | BODY MASS INDEX: 35.44 KG/M2 | WEIGHT: 207.6 LBS | SYSTOLIC BLOOD PRESSURE: 104 MMHG | HEIGHT: 64 IN

## 2023-09-27 DIAGNOSIS — N84.0 ENDOMETRIAL POLYP: ICD-10-CM

## 2023-09-27 DIAGNOSIS — Z00.00 ANNUAL PHYSICAL EXAM: Primary | ICD-10-CM

## 2023-09-27 DIAGNOSIS — E66.9 OBESITY (BMI 30-39.9): ICD-10-CM

## 2023-09-27 DIAGNOSIS — N93.9 ABNORMAL UTERINE BLEEDING (AUB): ICD-10-CM

## 2023-09-27 DIAGNOSIS — Z20.2 EXPOSURE TO STD: ICD-10-CM

## 2023-09-27 DIAGNOSIS — N94.6 DYSMENORRHEA: ICD-10-CM

## 2023-09-27 DIAGNOSIS — D25.0 FIBROIDS, SUBMUCOSAL: ICD-10-CM

## 2023-09-27 DIAGNOSIS — N39.3 STRESS INCONTINENCE IN FEMALE: ICD-10-CM

## 2023-09-27 DIAGNOSIS — Z01.419 ROUTINE GYNECOLOGICAL EXAMINATION: ICD-10-CM

## 2023-09-27 RX ORDER — ESTRADIOL 0.1 MG/G
CREAM VAGINAL
Qty: 1 EACH | Refills: 12 | Status: SHIPPED | OUTPATIENT
Start: 2023-09-27

## 2023-09-27 NOTE — PROGRESS NOTES
Gynecologic Annual Exam Note          GYN Annual Exam     Gynecologic Exam        Subjective     HPI  Kenya Hannon is a 48 y.o. female, , who presents for annual well woman exam as a established patient . Patient's last menstrual period was 2023 (approximate)..  Patient reports problems with: none.  Her periods occur every 25-35 days , lasting 4 days. The flow is Light to moderate.. She reports dysmenorrhea is moderate, occurring first 1-2 days of flow.     Partner Status: Marital Status: . She is is sexually active. She has not had new partners.. STD testing recommendations have been explained to the patient and she does desire STD testing. There were no changes to her medical or surgical history since her last visit..     Mixed incontinence - pelvic floor PT has helped some, losing weight has helped some.    Less feeling    Additional OB/GYN History   Current contraception: contraceptive methods: Vasectomy   Desires to: continue contraception    Last Pap : 2022. Result: negative. HPV: negative.   Last Completed Pap Smear            PAP SMEAR (Every 3 Years) Next due on 2023  Herpes Simplex Virus (HSV) 1 & 2, EFREM    2023  LIQUID-BASED PAP SMEAR WITH HPV GENOTYPING REGARDLESS OF INTERPRETATION ([facility])    2022  SCANNED - PAP SMEAR    2017  Done - Negative                  History of abnormal Pap smear: no  Family history of uterine, colon, breast, or ovarian cancer: yes - Mother, PGM, MA had colon cancer  Performs monthly Self-Breast Exam: yes  Last mammogram: 2023. Done at [facility]. Negative    Last Completed Mammogram            MAMMOGRAM (Yearly) Next due on 2023  MAMMO SCREENING DIGITAL TOMOSYNTHESIS BILATERAL W CAD    2020  MAMMO SCREENING DIGITAL TOMOSYNTHESIS BILATERAL W CAD                    History of abnormal mammogram: no    Colonoscopy: has had a colonoscopy 2 year(s) ago.  Exercises Regularly:  yes  Feelings of Anxiety or Depression: yes - Currently treated with Lexapro  Tobacco Usage?: No       Current Outpatient Medications:     albuterol sulfate  (90 Base) MCG/ACT inhaler, Inhale 1 puff., Disp: , Rfl:     escitalopram (LEXAPRO) 10 MG tablet, Take 1 tablet by mouth Daily., Disp: , Rfl:     metoprolol tartrate (LOPRESSOR) 25 MG tablet, Take 0.5 tablets by mouth 2 (Two) Times a Day., Disp: , Rfl:     Multiple Vitamins-Minerals (Bariatric Multivitamins/Iron) capsule, , Disp: , Rfl:     pantoprazole (PROTONIX) 40 MG EC tablet, , Disp: , Rfl:     estradiol (ESTRACE VAGINAL) 0.1 MG/GM vaginal cream, Insert 1 gm intravaginally 3 times each week for 3 weeks then weekly thereafter, Disp: 1 each, Rfl: 12    phenazopyridine (PYRIDIUM) 100 MG tablet, Take 1 tablet by mouth 3 (Three) Times a Day As Needed for Bladder Spasms., Disp: 15 tablet, Rfl: 0     Patient denies the need for medication refills today.    OB History          2    Para   2    Term   2            AB        Living   2         SAB        IAB        Ectopic        Molar        Multiple        Live Births   2                Past Medical History:   Diagnosis Date    Anemia     Anxiety     Asthma 07/15/2018    Chronic constipation     Depression     Endometriosis     Fibrocystic change of breast     GERD (gastroesophageal reflux disease)     Hypoglycemic disorder     Multiple gestation     Ovarian cyst     Patent foramen ovale     PFO (patent foramen ovale)     PONV (postoperative nausea and vomiting)     SAB (spontaneous )      (spontaneous vaginal delivery)     UTI (urinary tract infection)     Frequent        Past Surgical History:   Procedure Laterality Date    CHOLECYSTECTOMY      COLONOSCOPY      D & C WITH SUCTION      DILATATION AND CURETTAGE      ENDOSCOPY      GASTRIC SLEEVE LAPAROSCOPIC  2023    LAPAROSCOPIC CHOLECYSTECTOMY      WISDOM TOOTH EXTRACTION         Health Maintenance   Topic Date Due    BMI  "FOLLOWUP  Never done    COLORECTAL CANCER SCREENING  Never done    TDAP/TD VACCINES (1 - Tdap) Never done    ANNUAL PHYSICAL  Never done    Annual Gynecologic Pelvic and Breast Exam  04/13/2023    INFLUENZA VACCINE  08/01/2023    COVID-19 Vaccine (4 - 2023-24 season) 09/01/2023    MAMMOGRAM  05/05/2024    PAP SMEAR  09/27/2026    HEPATITIS C SCREENING  Completed    Pneumococcal Vaccine 0-64  Aged Out       The additional following portions of the patient's history were reviewed and updated as appropriate: allergies, current medications, past family history, past medical history, past social history, past surgical history, and problem list.    Review of Systems   All other systems reviewed and are negative.        I have reviewed and agree with the HPI, ROS, and historical information as entered above. Marta Cano MD          Objective   /78   Ht 162.6 cm (64.02\")   Wt 94.2 kg (207 lb 9.6 oz)   LMP 09/01/2023 (Approximate)   BMI 35.62 kg/mý     Physical Exam    General:  well developed; well nourished  no acute distress; BMI 36  Skin:  No suspicious lesions seen  Thyroid: normal to inspection and palpation  Breasts:  Examined in supine position  Symmetric without masses or skin dimpling  Nipples normal without inversion, lesions or discharge  There are no palpable axillary nodes  CVS: RRR, no M/R/G, distal pulses wnl  Resp: CTAB, No W/R/R  Abdomen: soft, non-tender; no masses  no umbilical or inguinal hernias are present  no hepato-splenomegaly  Pelvis: Clinical staff was present for exam  External genitalia:  normal appearance of the external genitalia including Bartholin's and Ririe's glands.  Urethra: no masses or tenderness  Urethral meatus: normal size;  No lesions  Bladder: non tender to palpation, no masses  Vagina:  normal pink mucosa without lesions.  Cervix:  normal appearance.  Uterus:  enlarged, somewhat TTP.  Adnexa:  normal bimanual exam of the adnexa.  Perineum/Anus: normal appearance, " no external hemorrhoids  Ext: 2+ pulses, no edema   Prolapse exam deferred.     Assessment and Plan    Problem List Items Addressed This Visit       Stress incontinence in female    Abnormal uterine bleeding (AUB)    Routine gynecological examination    Dysmenorrhea    Endometrial polyp    Fibroids, submucosal    Obesity (BMI 30-39.9)    Annual physical exam - Primary    Relevant Orders    LIQUID-BASED PAP SMEAR WITH HPV GENOTYPING REGARDLESS OF INTERPRETATION ([facility]) (Completed)    Herpes Simplex Virus (HSV) 1 & 2, EFREM (Completed)     Other Visit Diagnoses       Exposure to STD        Relevant Orders    LIQUID-BASED PAP SMEAR WITH HPV GENOTYPING REGARDLESS OF INTERPRETATION ([facility]) (Completed)    Herpes Simplex Virus (HSV) 1 & 2, EFREM (Completed)            GYN annual well woman exam.   Pap guidelines reviewed.  Pap with std screening done.  Needs surgery scheduled.  TLH/BSO, exam at preop.  Reviewed monthly self breast exams.  Instructed to call with lumps, pain, or breast discharge.    Ordered Mammogram today  Reviewed exercise as a preventative health measures.   Discussed importance of routine colon cancer screening. Reviewed current guidelines. Recommended colonoscopy after age 45.  Return for Annual physical.     Marta Cano MD  09/27/2023

## 2023-10-02 LAB
HSV1 DNA SPEC QL NAA+PROBE: NEGATIVE
HSV2 DNA SPEC QL NAA+PROBE: NEGATIVE
REF LAB TEST METHOD: NORMAL

## 2023-10-09 ENCOUNTER — TELEPHONE (OUTPATIENT)
Dept: OBSTETRICS AND GYNECOLOGY | Facility: CLINIC | Age: 48
End: 2023-10-09
Payer: OTHER GOVERNMENT

## 2023-10-09 NOTE — TELEPHONE ENCOUNTER
Informed patient of results and she v/u.    Reviewed chart and refilled medication within protocol.

## 2023-10-13 PROBLEM — Z00.00 ANNUAL PHYSICAL EXAM: Status: ACTIVE | Noted: 2023-10-13

## 2023-11-13 ENCOUNTER — TELEPHONE (OUTPATIENT)
Dept: OBSTETRICS AND GYNECOLOGY | Facility: CLINIC | Age: 48
End: 2023-11-13

## 2023-11-13 NOTE — TELEPHONE ENCOUNTER
Caller: Kenya Hannon    Relationship: Self    Best call back number: [phone]    PT WOULD LIKE TO KNOW IF SOMEONE CAN CALL HER AND LET HER KNOW IF WE HAVE RECEIVED [facility] THAT WAS FAXED TO THE OFFICE

## 2023-11-14 ENCOUNTER — TELEPHONE (OUTPATIENT)
Dept: OBSTETRICS AND GYNECOLOGY | Facility: CLINIC | Age: 48
End: 2023-11-14
Payer: OTHER GOVERNMENT

## 2023-11-14 NOTE — TELEPHONE ENCOUNTER
TOM patient.   Patient scheduled for TLH/BSO 1/3/2024.     She is potentially moving and will be going to [place], she states the drive is less than 4 hours and is wanting to know if that is okay.     Advised patient she could not lift heavy, car ride would be fine.     Educated patient on importance of keeping follow up with our office if she does move after surgery.

## 2024-01-02 ENCOUNTER — PREP FOR SURGERY (OUTPATIENT)
Dept: OTHER | Facility: HOSPITAL | Age: 49
End: 2024-01-02
Payer: OTHER GOVERNMENT

## 2024-01-02 DIAGNOSIS — R10.2 PELVIC PAIN: Primary | ICD-10-CM

## 2024-01-02 RX ORDER — PHENAZOPYRIDINE HYDROCHLORIDE 100 MG/1
200 TABLET, FILM COATED ORAL ONCE
OUTPATIENT
Start: 2024-01-02 | End: 2024-01-02

## 2024-01-02 RX ORDER — SODIUM CHLORIDE 9 MG/ML
40 INJECTION, SOLUTION INTRAVENOUS AS NEEDED
OUTPATIENT
Start: 2024-01-02

## 2024-01-02 RX ORDER — SODIUM CHLORIDE, SODIUM LACTATE, POTASSIUM CHLORIDE, CALCIUM CHLORIDE 600; 310; 30; 20 MG/100ML; MG/100ML; MG/100ML; MG/100ML
125 INJECTION, SOLUTION INTRAVENOUS CONTINUOUS
OUTPATIENT
Start: 2024-01-02

## 2024-01-02 RX ORDER — SODIUM CHLORIDE 0.9 % (FLUSH) 0.9 %
3 SYRINGE (ML) INJECTION EVERY 12 HOURS SCHEDULED
OUTPATIENT
Start: 2024-01-02

## 2024-01-02 RX ORDER — ACETAMINOPHEN 500 MG
1000 TABLET ORAL ONCE
OUTPATIENT
Start: 2024-01-02 | End: 2024-01-02

## 2024-01-02 RX ORDER — GABAPENTIN 300 MG/1
600 CAPSULE ORAL ONCE
OUTPATIENT
Start: 2024-01-02 | End: 2024-01-02

## 2024-01-02 RX ORDER — SODIUM CHLORIDE 0.9 % (FLUSH) 0.9 %
10 SYRINGE (ML) INJECTION AS NEEDED
OUTPATIENT
Start: 2024-01-02

## 2024-01-02 RX ORDER — SCOLOPAMINE TRANSDERMAL SYSTEM 1 MG/1
1 PATCH, EXTENDED RELEASE TRANSDERMAL CONTINUOUS
OUTPATIENT
Start: 2024-01-02 | End: 2024-01-05